# Patient Record
Sex: FEMALE | URBAN - METROPOLITAN AREA
[De-identification: names, ages, dates, MRNs, and addresses within clinical notes are randomized per-mention and may not be internally consistent; named-entity substitution may affect disease eponyms.]

---

## 2021-05-21 ENCOUNTER — TELEPHONE ENCOUNTER (OUTPATIENT)
Dept: URBAN - METROPOLITAN AREA CLINIC 98 | Facility: CLINIC | Age: 46
End: 2021-05-21

## 2021-05-21 ENCOUNTER — OFFICE VISIT (OUTPATIENT)
Dept: URBAN - METROPOLITAN AREA TELEHEALTH 2 | Facility: TELEHEALTH | Age: 46
End: 2021-05-21
Payer: COMMERCIAL

## 2021-05-21 DIAGNOSIS — K21.00 GASTROESOPHAGEAL REFLUX DISEASE WITH ESOPHAGITIS WITHOUT HEMORRHAGE: ICD-10-CM

## 2021-05-21 PROCEDURE — 99203 OFFICE O/P NEW LOW 30 MIN: CPT | Performed by: INTERNAL MEDICINE

## 2021-05-21 RX ORDER — BUPROPION HYDROCHLORIDE 300 MG/1
TAKE 1 TABLET (300 MG) BY ORAL ROUTE ONCE DAILY TABLET, EXTENDED RELEASE ORAL 1
Qty: 0 | Refills: 0 | Status: ACTIVE | COMMUNITY
Start: 1900-01-01 | End: 1900-01-01

## 2021-05-21 RX ORDER — FAMOTIDINE 40 MG/1
1 TABLET AT BEDTIME TABLET, FILM COATED ORAL ONCE A DAY
Qty: 30 | OUTPATIENT
Start: 2021-05-21

## 2021-05-21 RX ORDER — LANSOPRAZOLE 30 MG/1
CAPSULE, DELAYED RELEASE ORAL
Qty: 0 | Refills: 0 | Status: ACTIVE | COMMUNITY
Start: 1900-01-01 | End: 1900-01-01

## 2021-06-01 ENCOUNTER — OFFICE VISIT (OUTPATIENT)
Dept: URBAN - METROPOLITAN AREA CLINIC 98 | Facility: CLINIC | Age: 46
End: 2021-06-01

## 2021-06-23 ENCOUNTER — CLAIMS CREATED FROM THE CLAIM WINDOW (OUTPATIENT)
Dept: URBAN - METROPOLITAN AREA CLINIC 4 | Facility: CLINIC | Age: 46
End: 2021-06-23
Payer: COMMERCIAL

## 2021-06-23 ENCOUNTER — OFFICE VISIT (OUTPATIENT)
Dept: URBAN - METROPOLITAN AREA SURGERY CENTER 18 | Facility: SURGERY CENTER | Age: 46
End: 2021-06-23
Payer: COMMERCIAL

## 2021-06-23 DIAGNOSIS — K21.00 ALKALINE REFLUX ESOPHAGITIS: ICD-10-CM

## 2021-06-23 DIAGNOSIS — K31.89 MESENTEROAXIAL GASTRIC VOLVULUS: ICD-10-CM

## 2021-06-23 DIAGNOSIS — K31.89 ACQUIRED DEFORMITY OF DUODENUM: ICD-10-CM

## 2021-06-23 PROCEDURE — 88305 TISSUE EXAM BY PATHOLOGIST: CPT | Performed by: PATHOLOGY

## 2021-06-23 PROCEDURE — G8907 PT DOC NO EVENTS ON DISCHARG: HCPCS | Performed by: INTERNAL MEDICINE

## 2021-06-23 PROCEDURE — 43239 EGD BIOPSY SINGLE/MULTIPLE: CPT | Performed by: INTERNAL MEDICINE

## 2021-06-23 PROCEDURE — 88312 SPECIAL STAINS GROUP 1: CPT | Performed by: PATHOLOGY

## 2021-06-23 RX ORDER — BUPROPION HYDROCHLORIDE 300 MG/1
TAKE 1 TABLET (300 MG) BY ORAL ROUTE ONCE DAILY TABLET, EXTENDED RELEASE ORAL 1
Qty: 0 | Refills: 0 | Status: ACTIVE | COMMUNITY
Start: 1900-01-01 | End: 1900-01-01

## 2021-06-23 RX ORDER — FAMOTIDINE 40 MG/1
1 TABLET AT BEDTIME TABLET, FILM COATED ORAL ONCE A DAY
Qty: 30 | Status: ACTIVE | COMMUNITY
Start: 2021-05-21

## 2021-06-23 RX ORDER — LANSOPRAZOLE 30 MG/1
CAPSULE, DELAYED RELEASE ORAL
Qty: 0 | Refills: 0 | Status: ACTIVE | COMMUNITY
Start: 1900-01-01 | End: 1900-01-01

## 2024-01-16 ENCOUNTER — OFFICE VISIT (OUTPATIENT)
Dept: URBAN - METROPOLITAN AREA CLINIC 98 | Facility: CLINIC | Age: 49
End: 2024-01-16
Payer: COMMERCIAL

## 2024-01-16 DIAGNOSIS — K60.2 ANAL FISSURE: ICD-10-CM

## 2024-01-16 DIAGNOSIS — K64.8 HEMORRHOIDS, INTERNAL: ICD-10-CM

## 2024-01-16 DIAGNOSIS — K21.00 GASTROESOPHAGEAL REFLUX DISEASE WITH ESOPHAGITIS WITHOUT HEMORRHAGE: ICD-10-CM

## 2024-01-16 PROBLEM — 165626000: Status: ACTIVE | Noted: 2024-01-16

## 2024-01-16 PROBLEM — 90458007: Status: ACTIVE | Noted: 2024-01-16

## 2024-01-16 PROCEDURE — 99214 OFFICE O/P EST MOD 30 MIN: CPT | Performed by: INTERNAL MEDICINE

## 2024-01-16 RX ORDER — PHENTERMINE HYDROCHLORIDE 15 MG/1
TAKE 1 CAPSULE BY MOUTH EVERY DAY CAPSULE ORAL
Qty: 30 EACH | Refills: 0 | Status: ACTIVE | COMMUNITY

## 2024-01-16 RX ORDER — ESCITALOPRAM 10 MG/1
TABLET, FILM COATED ORAL
Qty: 45 TABLET | Status: ACTIVE | COMMUNITY

## 2024-01-16 RX ORDER — BUPROPION HYDROCHLORIDE 300 MG/1
TAKE 1 TABLET (300 MG) BY ORAL ROUTE ONCE DAILY TABLET, EXTENDED RELEASE ORAL 1
Qty: 0 | Refills: 0 | Status: ACTIVE | COMMUNITY
Start: 1900-01-01

## 2024-01-16 RX ORDER — WHEAT DEXTRIN 3 G/3.5 G
AS DIRECTED POWDER (GRAM) ORAL
OUTPATIENT

## 2024-01-16 RX ORDER — SODIUM, POTASSIUM,MAG SULFATES 17.5-3.13G
354ML SOLUTION, RECONSTITUTED, ORAL ORAL ONCE
Qty: 354 MILLILITER | Refills: 0 | OUTPATIENT

## 2024-01-16 NOTE — HPI-TODAY'S VISIT:
f/u visit Last seen 2021 Hemorrhoids developed 2mo Itching/bleeding/burning Developed after starting some supplements leading to loose stools with straining Of note, she reports low ferritin      . PRIOR VISTI: pantoprazole 40 q am reflux with choking at night choking after meals

## 2024-02-15 ENCOUNTER — COL/EGD (OUTPATIENT)
Dept: URBAN - METROPOLITAN AREA SURGERY CENTER 18 | Facility: SURGERY CENTER | Age: 49
End: 2024-02-15
Payer: COMMERCIAL

## 2024-02-15 DIAGNOSIS — K29.30 CHRONIC SUPERFICIAL GASTRITIS: ICD-10-CM

## 2024-02-15 DIAGNOSIS — K31.89 OTHER DISEASES OF STOMACH AND DUODENUM: ICD-10-CM

## 2024-02-15 DIAGNOSIS — Z12.11 COLON CANCER SCREENING: ICD-10-CM

## 2024-02-15 DIAGNOSIS — D50.9 ANEMIA: ICD-10-CM

## 2024-02-15 PROCEDURE — 43239 EGD BIOPSY SINGLE/MULTIPLE: CPT | Performed by: INTERNAL MEDICINE

## 2024-02-15 PROCEDURE — 45378 DIAGNOSTIC COLONOSCOPY: CPT | Performed by: INTERNAL MEDICINE

## 2024-02-15 PROCEDURE — G8907 PT DOC NO EVENTS ON DISCHARG: HCPCS | Performed by: INTERNAL MEDICINE

## 2024-02-15 RX ORDER — BUPROPION HYDROCHLORIDE 300 MG/1
TAKE 1 TABLET (300 MG) BY ORAL ROUTE ONCE DAILY TABLET, EXTENDED RELEASE ORAL 1
Qty: 0 | Refills: 0 | Status: ACTIVE | COMMUNITY
Start: 1900-01-01

## 2024-02-15 RX ORDER — WHEAT DEXTRIN 3 G/3.5 G
AS DIRECTED POWDER (GRAM) ORAL
Status: ACTIVE | COMMUNITY

## 2024-02-15 RX ORDER — PHENTERMINE HYDROCHLORIDE 15 MG/1
TAKE 1 CAPSULE BY MOUTH EVERY DAY CAPSULE ORAL
Qty: 30 EACH | Refills: 0 | Status: ACTIVE | COMMUNITY

## 2024-02-15 RX ORDER — SODIUM, POTASSIUM,MAG SULFATES 17.5-3.13G
354ML SOLUTION, RECONSTITUTED, ORAL ORAL ONCE
Qty: 354 MILLILITER | Refills: 0 | Status: ACTIVE | COMMUNITY

## 2024-02-15 RX ORDER — ESCITALOPRAM 10 MG/1
TABLET, FILM COATED ORAL
Qty: 45 TABLET | Status: ACTIVE | COMMUNITY

## 2024-05-07 ENCOUNTER — LAB OUTSIDE AN ENCOUNTER (OUTPATIENT)
Dept: URBAN - METROPOLITAN AREA CLINIC 98 | Facility: CLINIC | Age: 49
End: 2024-05-07

## 2024-05-07 ENCOUNTER — OFFICE VISIT (OUTPATIENT)
Dept: URBAN - METROPOLITAN AREA CLINIC 98 | Facility: CLINIC | Age: 49
End: 2024-05-07
Payer: COMMERCIAL

## 2024-05-07 ENCOUNTER — DASHBOARD ENCOUNTERS (OUTPATIENT)
Age: 49
End: 2024-05-07

## 2024-05-07 VITALS
HEART RATE: 98 BPM | DIASTOLIC BLOOD PRESSURE: 79 MMHG | HEIGHT: 64 IN | BODY MASS INDEX: 33.39 KG/M2 | TEMPERATURE: 97.2 F | WEIGHT: 195.6 LBS | SYSTOLIC BLOOD PRESSURE: 125 MMHG

## 2024-05-07 DIAGNOSIS — R79.0 LOW FERRITIN: ICD-10-CM

## 2024-05-07 DIAGNOSIS — K64.8 HEMORRHOIDS, INTERNAL: ICD-10-CM

## 2024-05-07 DIAGNOSIS — K21.9 GASTROESOPHAGEAL REFLUX DISEASE, UNSPECIFIED WHETHER ESOPHAGITIS PRESENT: ICD-10-CM

## 2024-05-07 DIAGNOSIS — Z12.11 COLON CANCER SCREENING: ICD-10-CM

## 2024-05-07 PROCEDURE — 99214 OFFICE O/P EST MOD 30 MIN: CPT | Performed by: INTERNAL MEDICINE

## 2024-05-07 RX ORDER — SODIUM, POTASSIUM,MAG SULFATES 17.5-3.13G
354ML SOLUTION, RECONSTITUTED, ORAL ORAL ONCE
Qty: 354 MILLILITER | Refills: 0 | Status: ACTIVE | COMMUNITY

## 2024-05-07 RX ORDER — ESCITALOPRAM 10 MG/1
TABLET, FILM COATED ORAL
Qty: 45 TABLET | Status: ACTIVE | COMMUNITY

## 2024-05-07 RX ORDER — WHEAT DEXTRIN 3 G/3.5 G
AS DIRECTED POWDER (GRAM) ORAL
Status: ACTIVE | COMMUNITY

## 2024-05-07 RX ORDER — PHENTERMINE HYDROCHLORIDE 15 MG/1
TAKE 1 CAPSULE BY MOUTH EVERY DAY CAPSULE ORAL
Qty: 30 EACH | Refills: 0 | Status: ACTIVE | COMMUNITY

## 2024-05-07 RX ORDER — BUPROPION HYDROCHLORIDE 300 MG/1
TAKE 1 TABLET (300 MG) BY ORAL ROUTE ONCE DAILY TABLET, EXTENDED RELEASE ORAL 1
Qty: 0 | Refills: 0 | Status: ACTIVE | COMMUNITY
Start: 1900-01-01

## 2024-05-07 RX ORDER — WHEAT DEXTRIN 3 G/3.5 G
AS DIRECTED POWDER (GRAM) ORAL
OUTPATIENT

## 2024-05-07 RX ORDER — LANSOPRAZOLE 30 MG/1
1 CAPSULE BEFORE A MEAL CAPSULE, DELAYED RELEASE ORAL ONCE A DAY
Qty: 90 CAPSULE | Refills: 3 | OUTPATIENT
Start: 2024-05-07

## 2024-05-08 LAB
BASO (ABSOLUTE): 0
BASOS: 1
EOS (ABSOLUTE): 0.1
EOS: 1
FERRITIN, SERUM: 10
HEMATOCRIT: 38.7
HEMATOLOGY COMMENTS:: (no result)
HEMOGLOBIN: 12.8
IMMATURE CELLS: (no result)
IMMATURE GRANS (ABS): 0
IMMATURE GRANULOCYTES: 0
IRON BIND.CAP.(TIBC): 364
IRON SATURATION: 12
IRON: 42
LYMPHS (ABSOLUTE): 2.4
LYMPHS: 34
MCH: 29.6
MCHC: 33.1
MCV: 89
MONOCYTES(ABSOLUTE): 0.6
MONOCYTES: 8
NEUTROPHILS (ABSOLUTE): 4
NEUTROPHILS: 56
NRBC: (no result)
PLATELETS: 314
RBC: 4.33
RDW: 12.2
UIBC: 322
WBC: 7.1